# Patient Record
Sex: MALE | Race: WHITE | NOT HISPANIC OR LATINO | Employment: FULL TIME | URBAN - METROPOLITAN AREA
[De-identification: names, ages, dates, MRNs, and addresses within clinical notes are randomized per-mention and may not be internally consistent; named-entity substitution may affect disease eponyms.]

---

## 2024-03-05 ENCOUNTER — OFFICE VISIT (OUTPATIENT)
Dept: OPHTHALMOLOGY | Facility: CLINIC | Age: 29
End: 2024-03-05
Payer: COMMERCIAL

## 2024-03-05 DIAGNOSIS — T15.01XA FOREIGN BODY OF RIGHT CORNEA, INITIAL ENCOUNTER: Primary | ICD-10-CM

## 2024-03-05 PROCEDURE — 1160F RVW MEDS BY RX/DR IN RCRD: CPT | Mod: CPTII,S$GLB,, | Performed by: OPTOMETRIST

## 2024-03-05 PROCEDURE — 99203 OFFICE O/P NEW LOW 30 MIN: CPT | Mod: 25,S$GLB,, | Performed by: OPTOMETRIST

## 2024-03-05 PROCEDURE — 65222 REMOVE FOREIGN BODY FROM EYE: CPT | Mod: RT,S$GLB,, | Performed by: OPTOMETRIST

## 2024-03-05 PROCEDURE — 99999 PR PBB SHADOW E&M-NEW PATIENT-LVL II: CPT | Mod: PBBFAC,,, | Performed by: OPTOMETRIST

## 2024-03-05 PROCEDURE — 1159F MED LIST DOCD IN RCRD: CPT | Mod: CPTII,S$GLB,, | Performed by: OPTOMETRIST

## 2024-03-05 RX ORDER — TOBRAMYCIN 3 MG/ML
1 SOLUTION/ DROPS OPHTHALMIC 3 TIMES DAILY
Qty: 5 ML | Refills: 0 | Status: SHIPPED | OUTPATIENT
Start: 2024-03-05 | End: 2024-03-10

## 2024-03-05 NOTE — PROGRESS NOTES
"HPI     Eye Problem            Comments: Pain Scale:  0  Onset:  1 week  OD, OS, OU: OD  Discharge: watering  A.M. Matting:  yes  Itch: no  Redness:  yes  Photophobia:  no  Foreign body sensation:   yes  Deep pain:   no  Previous occurrence:   no  Drops:   "propolean glycol gtts"  Pt using air compressor, wearing safety glasses but dust got in. Pt used   eye wash station immediately. No pain but still feels irritated and FB   sensation          Last edited by Micaela Blanchard MA on 3/5/2024 10:19 AM.            Assessment /Plan     For exam results, see Encounter Report.    Foreign body of right cornea, initial encounter    Other orders  -     tobramycin sulfate 0.3% (TOBREX) 0.3 % ophthalmic solution; Place 1 drop into the right eye 3 (three) times daily. for 5 days  Dispense: 5 mL; Refill: 0      Instilled one drop of tetracaine ophthalmic solution into the patient's eye. A  sterile golf club spud was used to remove foreign body from the patient's cornea with the patient seated behind the slit lamp.  The patient tolerated the procedure well and without complication. Instructed pt to use genteal gel prn for comfort. Rx for antibiotic drops was given.  Pt to RTC PRN if symptoms worsen or persist x 2 days.   Discussed above and answered questions.                     "